# Patient Record
Sex: MALE | Race: WHITE | Employment: OTHER | URBAN - METROPOLITAN AREA
[De-identification: names, ages, dates, MRNs, and addresses within clinical notes are randomized per-mention and may not be internally consistent; named-entity substitution may affect disease eponyms.]

---

## 2022-07-15 ENCOUNTER — HOSPITAL ENCOUNTER (EMERGENCY)
Age: 72
Discharge: HOME | End: 2022-07-15
Payer: MEDICARE

## 2022-07-15 VITALS
TEMPERATURE: 97.7 F | RESPIRATION RATE: 16 BRPM | WEIGHT: 190 LBS | HEART RATE: 101 BPM | DIASTOLIC BLOOD PRESSURE: 84 MMHG | OXYGEN SATURATION: 96 % | SYSTOLIC BLOOD PRESSURE: 124 MMHG

## 2022-07-15 DIAGNOSIS — J01.90 ACUTE SINUSITIS, RECURRENCE NOT SPECIFIED, UNSPECIFIED LOCATION: Primary | ICD-10-CM

## 2022-07-15 PROCEDURE — 99211 OFF/OP EST MAY X REQ PHY/QHP: CPT

## 2022-07-15 RX ORDER — GEMFIBROZIL 600 MG/1
600 TABLET, FILM COATED ORAL
COMMUNITY

## 2022-07-15 RX ORDER — AMOXICILLIN AND CLAVULANATE POTASSIUM 875; 125 MG/1; MG/1
1 TABLET, FILM COATED ORAL 2 TIMES DAILY
Qty: 14 TABLET | Refills: 0 | Status: SHIPPED | OUTPATIENT
Start: 2022-07-15 | End: 2022-07-22

## 2022-07-15 RX ORDER — SIMVASTATIN 20 MG
20 TABLET ORAL NIGHTLY
COMMUNITY

## 2022-07-15 ASSESSMENT — PAIN SCALES - GENERAL: PAINLEVEL_OUTOF10: 0

## 2022-07-15 ASSESSMENT — PAIN - FUNCTIONAL ASSESSMENT: PAIN_FUNCTIONAL_ASSESSMENT: 0-10

## 2022-07-15 NOTE — ED PROVIDER NOTES
Department of Emergency Medicine   [address] [place] [address]  Provider Note  Admit Date/RoomTime: 7/15/2022  5:05 PM  Room:   NAME: Betty Parks  : 1950  MRN: [id number]     Chief Complaint:  Sinusitis (Congestion, pressure, started yesterday)    History of Present Illness       Betty Parks is a 67 y.o. old male who presents to the urgent care center by private vehicle for sinus infection. He said it started 2 days ago he said he gets sinus infections a lot he started having the pressure over his cheeks and the tenderness over his cheeks so he came in for evaluation. He is denying any fevers, sore throat, ear pain, cough, shortness of breath or any other complaints. ROS    Pertinent positives and negatives are stated within HPI, all other systems reviewed and are negative. Past Surgical History:   Procedure Laterality Date    KNEE ARTHROSCOPY     Social History:    Family History: family history is not on file. Allergies: Patient has no known allergies. Physical Exam            ED Triage Vitals   BP Temp Temp src Heart Rate Resp SpO2 Height Weight   07/15/22 1708 07/15/22 1708 -- 07/15/22 1708 07/15/22 1708 07/15/22 1708 -- 07/15/22 1709   124/84 97.7 °F (36.5 °C)  (!) 101 16 96 %  190 lb (86.2 kg)      Oxygen Saturation Interpretation: Normal.    Constitutional:  Alert, development consistent with age. Ears:  External Ears: Bilateral normal.               TM's & External Canals: normal appearance, normal TMs bilaterally. Nose:   There is no discharge, swelling or lesions noted. Sinuses: mild bilateral maxillary sinus tenderness. no bilateral frontal sinus tenderness. .  Neck/Lymphatics:  Neck Supple. Respiratory:   Breath sounds: bilateral normal.  Lung sounds: normal.   CV:  Regular rate and rhythm, normal heart sounds, without pathological murmurs, ectopy, gallops, or rubs. Integument:   Warm, dry, without visible rash.   Neurological:

## 2022-11-03 ENCOUNTER — HOSPITAL ENCOUNTER (EMERGENCY)
Age: 72
Discharge: HOME | End: 2022-11-03
Payer: MEDICARE

## 2022-11-03 VITALS
BODY MASS INDEX: 27.2 KG/M2 | RESPIRATION RATE: 20 BRPM | OXYGEN SATURATION: 97 % | WEIGHT: 190 LBS | DIASTOLIC BLOOD PRESSURE: 78 MMHG | SYSTOLIC BLOOD PRESSURE: 141 MMHG | HEART RATE: 106 BPM | HEIGHT: 70 IN | TEMPERATURE: 98.2 F

## 2022-11-03 DIAGNOSIS — H61.21 IMPACTED CERUMEN OF RIGHT EAR: Primary | ICD-10-CM

## 2022-11-03 PROCEDURE — 69209 REMOVE IMPACTED EAR WAX UNI: CPT

## 2022-11-03 PROCEDURE — 99211 OFF/OP EST MAY X REQ PHY/QHP: CPT

## 2022-11-03 ASSESSMENT — PAIN - FUNCTIONAL ASSESSMENT: PAIN_FUNCTIONAL_ASSESSMENT: 0-10

## 2022-11-03 ASSESSMENT — PAIN SCALES - GENERAL: PAINLEVEL_OUTOF10: 3

## 2022-11-03 ASSESSMENT — PAIN DESCRIPTION - DESCRIPTORS: DESCRIPTORS: PRESSURE

## 2022-11-03 ASSESSMENT — PAIN DESCRIPTION - LOCATION: LOCATION: EAR

## 2022-11-03 ASSESSMENT — PAIN DESCRIPTION - ORIENTATION: ORIENTATION: RIGHT

## 2022-11-03 NOTE — ED PROVIDER NOTES
[address] [facility]  Department of Emergency Medicine  UC Encounter Note  11/3/22   9:41 AM EDT      NAME: Marisela Jackson  :  1950  MRN:  [id number]    Chief Complaint: Ear Problem (Right ear feels clogged)      This is a 69-year-old male the presents to urgent care complaining of some muffled hearing in the right ear some mild pressure to the right ear for the past couple days. Has been using some peroxide in the right ear to clean out a possible wax impaction. Denies any left ear problems. He denies any hearing problems to begin with. He does not wear hearing aids. No fevers or chills no sinus pain pressure or URI symptoms. No chest pain or shortness of breath. On first contact patient he appears to be in no acute distress. Review of Systems  Pertinent positives and negatives are stated within HPI, all other systems reviewed and are negative. Physical Exam  Vitals and nursing note reviewed. Constitutional:       Appearance: He is well-developed. HENT:      Head: Normocephalic and atraumatic. Jaw: There is normal jaw occlusion. No trismus. Right Ear: Hearing and external ear normal. There is impacted cerumen. No mastoid tenderness. Left Ear: Hearing, tympanic membrane, ear canal and external ear normal. No mastoid tenderness. No hemotympanum. Nose: Nose normal. No congestion or rhinorrhea. Right Sinus: No maxillary sinus tenderness or frontal sinus tenderness. Left Sinus: No maxillary sinus tenderness or frontal sinus tenderness. Mouth/Throat:      Mouth: Mucous membranes are moist. No angioedema. Pharynx: Oropharynx is clear. Uvula midline. No uvula swelling. Eyes:      General: Lids are normal.      Conjunctiva/sclera: Conjunctivae normal.      Pupils: Pupils are equal, round, and reactive to light. Cardiovascular:      Rate and Rhythm: Normal rate and regular rhythm. Heart sounds: Normal heart sounds.  No murmur heard.  Pulmonary:      Effort: Pulmonary effort is normal.      Breath sounds: Normal breath sounds. Abdominal:      General: Bowel sounds are normal.      Palpations: Abdomen is soft. Abdomen is not rigid. Tenderness: There is no abdominal tenderness. There is no guarding or rebound. Musculoskeletal:      Cervical back: Normal range of motion and neck supple. Skin:     General: Skin is warm and dry. Findings: No abrasion or rash. Neurological:      General: No focal deficit present. Mental Status: He is alert and oriented to person, place, and time. GCS: GCS eye subscore is 4. GCS verbal subscore is 5. GCS motor subscore is 6. Cranial Nerves: No cranial nerve deficit. Sensory: No sensory deficit. Coordination: Coordination normal.      Gait: Gait normal.       Procedures    MDM  Number of Diagnoses or Management Options  Impacted cerumen of right ear  Diagnosis management comments: No acute distress. EAC was flushed by nurse. Ear was rechecked. TM normal.  No open area no bleeding. The EAC is slightly irritated but not bleeding or infected. He does state that his hearing is better. Instructions given.             --------------------------------------------- PAST HISTORY ---------------------------------------------  Past Medical History:  has a past medical history of Diabetes mellitus ([facility] [address] and Hyperlipidemia. Past Surgical History:  has a past surgical history that includes Knee arthroscopy. Social History:      Family History: family history is not on file. The patients home medications have been reviewed. Allergies: Patient has no known allergies. -------------------------------------------------- RESULTS -------------------------------------------------  No results found for this visit on 11/03/22.   No orders to display       ------------------------- NURSING NOTES AND VITALS REVIEWED ---------------------------   The nursing notes within the ED encounter and vital signs as below have been reviewed. BP (!) 141/78   Pulse (!) 106   Temp 98.2 °F (36.8 °C)   Resp 20   Ht 5' 10\" (1.778 m)   Wt 190 lb (86.2 kg)   SpO2 97%   BMI 27.26 kg/m²   Oxygen Saturation Interpretation: Normal      ------------------------------------------ PROGRESS NOTES ------------------------------------------   I have spoken with the patient and discussed todays results, in addition to providing specific details for the plan of care and counseling regarding the diagnosis and prognosis. Their questions are answered at this time and they are agreeable with the plan.      --------------------------------- ADDITIONAL PROVIDER NOTES ---------------------------------     This patient is stable for discharge. I have shared the specific conditions for return, as well as the importance of follow-up. * NOTE: This report was transcribed using voice recognition software. Every effort was made to ensure accuracy; however, inadvertent computerized transcription errors may be present.    --------------------------------- IMPRESSION AND DISPOSITION ---------------------------------    IMPRESSION  1.  Impacted cerumen of right ear        DISPOSITION  Disposition: Discharge to home  Patient condition is good       Martinez Feliciano PA-C  11/03/22 [id number]

## 2022-11-03 NOTE — ED NOTES
Irrigated right ear with good results. Pt states that he can hear well out of ear now.      Kena Falcon, JACINTO  42/32/80 [id number]

## 2022-12-26 ENCOUNTER — HOSPITAL ENCOUNTER (EMERGENCY)
Age: 72
Discharge: HOME | End: 2022-12-26
Payer: MEDICARE

## 2022-12-26 VITALS
TEMPERATURE: 98.4 F | DIASTOLIC BLOOD PRESSURE: 80 MMHG | SYSTOLIC BLOOD PRESSURE: 124 MMHG | RESPIRATION RATE: 16 BRPM | HEART RATE: 92 BPM | OXYGEN SATURATION: 99 %

## 2022-12-26 DIAGNOSIS — J01.90 ACUTE SINUSITIS, RECURRENCE NOT SPECIFIED, UNSPECIFIED LOCATION: Primary | ICD-10-CM

## 2022-12-26 PROCEDURE — 99211 OFF/OP EST MAY X REQ PHY/QHP: CPT

## 2022-12-26 RX ORDER — AMOXICILLIN AND CLAVULANATE POTASSIUM 875; 125 MG/1; MG/1
1 TABLET, FILM COATED ORAL 2 TIMES DAILY
Qty: 14 TABLET | Refills: 0 | Status: SHIPPED | OUTPATIENT
Start: 2022-12-26 | End: 2023-01-02

## 2022-12-26 NOTE — ED PROVIDER NOTES
Department of Emergency Medicine   [address] [place] [address]  Provider Note  Admit Date/RoomTime: 2022 10:15 AM  Room:   NAME: Ori Wiggins  : 1950  MRN: [id number]     Chief Complaint:  Sinus Problem (States he has a sinus infection, pressure, congestion, PND, started over the weekend)    History of Present Illness       Ori Wiggins is a 67 y.o. old male who presents to the urgent care center by private vehicle for evaluation. He has had sinus symptoms for a few days now. He said he is prone to getting sinus infections he has pressure over his forehead and behind his eyes. And congestion and drainage he denies fever, body aches, chills, chest pain or shortness of breath. He said he took a COVID test this morning and it was negative. ROS    Pertinent positives and negatives are stated within HPI, all other systems reviewed and are negative. Past Surgical History:   Procedure Laterality Date    KNEE ARTHROSCOPY     Social History:  reports that he has never smoked. He does not have any smokeless tobacco history on file. Family History: family history is not on file. Allergies: Patient has no known allergies. Physical Exam            ED Triage Vitals [22 1016]   BP Temp Temp src Heart Rate Resp SpO2 Height Weight   124/80 98.4 °F (36.9 °C) -- 92 16 99 % -- --      Oxygen Saturation Interpretation: Normal.    Constitutional:  Alert, appears well, no distress. Ears:  External Ears: Bilateral normal.               Nose:   There is no discharge, swelling or lesions noted. Sinuses: no bilateral maxillary sinus tenderness. mild bilateral frontal sinus tenderness. Mouth:  normal tongue and buccal mucosa. Throat: no erythema or exudates noted. Teeth and gums normal..  Airway patent. Neck/Lymphatics:  Neck Supple.    Respiratory:   Breath sounds: bilateral normal.  Lung sounds: normal.   CV:  Regular rate and rhythm, normal heart sounds, without pathological murmurs, ectopy, gallops, or rubs. GI:  Abdomen Soft, nontender, good bowel sounds. No firm or pulsatile mass. Integument:  Normal turgor. Warm, dry, without visible rash. Neurological:  Oriented. Motor functions intact. Lab / Imaging Results   (All laboratory and radiology results have been personally reviewed by myself)  Labs:  No results found for this visit on 12/26/22. Imaging: All Radiology results interpreted by Radiologist unless otherwise noted. No orders to display     ED Course / Medical Decision Making   Medications - No data to display       MDM:   Patient has had sinusitis for a few days he is prone to getting it he has pressure over his forehead he has postnasal drainage and congestion. No other symptoms he tested for COVID today and it was negative. I considered influenza but he has no fever body aches cough or any other symptoms I did treat him for sinusitis with Augmentin and advised him to follow-up with his doctor    1. Acute sinusitis, recurrence not specified, unspecified location      Plan   Discharge to home and advised to contact [facility] [phone]      As needed   Patient condition is good    New Medications     New Prescriptions    AMOXICILLIN-CLAVULANATE (AUGMENTIN) 875-125 MG PER TABLET    Take 1 tablet by mouth 2 times daily for 7 days     Electronically signed by ANGIE Malone CNP   DD: 12/26/22  **This report was transcribed using voice recognition software. Every effort was made to ensure accuracy; however, inadvertent computerized transcription errors may be present.   END OF ED PROVIDER NOTE      ANGIE Malone CNP  12/26/22 1125

## 2024-08-02 ENCOUNTER — HOSPITAL ENCOUNTER (EMERGENCY)
Age: 74
Discharge: HOME | End: 2024-08-02
Payer: MEDICARE

## 2024-08-02 VITALS
OXYGEN SATURATION: 96 % | HEIGHT: 70 IN | RESPIRATION RATE: 18 BRPM | WEIGHT: 185 LBS | SYSTOLIC BLOOD PRESSURE: 128 MMHG | BODY MASS INDEX: 26.48 KG/M2 | DIASTOLIC BLOOD PRESSURE: 99 MMHG | TEMPERATURE: 98.6 F | HEART RATE: 99 BPM

## 2024-08-02 DIAGNOSIS — J02.9 VIRAL PHARYNGITIS: Primary | ICD-10-CM

## 2024-08-02 LAB
SPECIMEN SOURCE: NORMAL
STREP A, MOLECULAR: NEGATIVE

## 2024-08-02 PROCEDURE — 87651 STREP A DNA AMP PROBE: CPT

## 2024-08-02 PROCEDURE — 99211 OFF/OP EST MAY X REQ PHY/QHP: CPT

## 2024-08-02 ASSESSMENT — PAIN - FUNCTIONAL ASSESSMENT: PAIN_FUNCTIONAL_ASSESSMENT: 0-10

## 2024-08-02 ASSESSMENT — PAIN SCALES - GENERAL: PAINLEVEL_OUTOF10: 0

## 2024-08-02 NOTE — ED PROVIDER NOTES
[facility]  Department of Emergency Medicine     Encounter Note  Admit Date/RoomTime: 2024 12:45 PM   Room:       NAME: Rafa Hines  : 1950  MRN: [id number]     Chief Complaint:  Pharyngitis (Started yesterday)    History of Present Illness      Rafa Hines is a 74 y.o. old male who presents to the urgent care with a 1 day history of a sore throat.  Patient states that his throat became somewhat sore yesterday.  No difficulty breathing, difficulty swallowing, drooling, wheezing, fevers, cough, nasal congestion, any other constitutional symptoms.  States his throat just feels a little bit scratchy.  Thinks he has strep.  No known sick contacts.              Exposed To:  Streptococcus: no.                              Infectious Mononucleosis:  unknown.        Symptoms:  Pain:  mild.                            Muffled Voice:  No.                            Hoarse:  No.                            Difficulty with Secretions:  No.    Centor Score (MODIFIED) For Strep Pharyngitis:    Age 3-14 Years   no (0)     Age >45 Years   YES  -1     Exudate or Swelling on Tonsils   no (0)     Tender/Swollen Anterior Cervical Nodes   no (0)     Fever? (T > 38°C, 100.4°F)   no (0)     Absence of Cough   yes (1)   TOTAL POINTS   0   Score of Zero = Probability of Strep Pharyngitis: 1% - 2.5%.,   No further testing nor antibiotics.  Score of 1 = Probability of Strep Pharyngitis: 5% - 10%.,   No further testing nor antibiotics.  Score of 2 = Probability of Strep Phar: 11% - 17%.   Culture/test all, ATB's only for positive culture results.  Score of 3 = Probability of Strep Phar: 28% - 35%.   Culture/test all, ATB's only for positive culture results  Score of 4 or 5 = Probability of Strep Pharyngitis: 51% - 53%.     Treat empirically with antibiotics.    ROS   Pertinent positives and negatives are stated within HPI, all other systems reviewed and are negative.    Past Medical History:  has a past

## 2024-08-02 NOTE — DISCHARGE INSTRUCTIONS
Your strep testing was negative.  Your symptoms are likely viral.  You may use salt water gargles and over-the-counter Chloraseptic throat spray as needed for sore throat.  Please follow-up with your regular doctor if symptoms do not improve.

## 2025-01-04 ENCOUNTER — HOSPITAL ENCOUNTER (EMERGENCY)
Age: 75
Discharge: HOME | End: 2025-01-04
Payer: MEDICARE

## 2025-01-04 VITALS
WEIGHT: 192 LBS | BODY MASS INDEX: 27.55 KG/M2 | OXYGEN SATURATION: 96 % | SYSTOLIC BLOOD PRESSURE: 129 MMHG | RESPIRATION RATE: 18 BRPM | DIASTOLIC BLOOD PRESSURE: 76 MMHG | TEMPERATURE: 99.1 F | HEART RATE: 100 BPM

## 2025-01-04 DIAGNOSIS — J06.9 VIRAL UPPER RESPIRATORY ILLNESS: Primary | ICD-10-CM

## 2025-01-04 LAB
FLUAV RNA RESP QL NAA+PROBE: NOT DETECTED
FLUBV RNA RESP QL NAA+PROBE: NOT DETECTED
SARS-COV-2 RNA RESP QL NAA+PROBE: NOT DETECTED
SOURCE: NORMAL
SPECIMEN DESCRIPTION: NORMAL

## 2025-01-04 PROCEDURE — 87636 SARSCOV2 & INF A&B AMP PRB: CPT

## 2025-01-04 PROCEDURE — 99211 OFF/OP EST MAY X REQ PHY/QHP: CPT

## 2025-01-04 RX ORDER — FLUTICASONE PROPIONATE 50 MCG
2 SPRAY, SUSPENSION (ML) NASAL DAILY
Qty: 16 G | Refills: 0 | Status: SHIPPED | OUTPATIENT
Start: 2025-01-04

## 2025-01-04 RX ORDER — IBUPROFEN 600 MG/1
600 TABLET, FILM COATED ORAL EVERY 8 HOURS PRN
Qty: 30 TABLET | Refills: 0 | Status: SHIPPED | OUTPATIENT
Start: 2025-01-04

## 2025-01-04 RX ORDER — DEXTROMETHORPHAN HBR, GUAIFENESIN 60; 1200 MG/1; MG/1
1 TABLET, EXTENDED RELEASE ORAL EVERY 12 HOURS PRN
Qty: 14 TABLET | Refills: 0 | Status: SHIPPED | OUTPATIENT
Start: 2025-01-04 | End: 2025-01-11

## 2025-01-04 RX ORDER — ACETAMINOPHEN 500 MG
1000 TABLET ORAL EVERY 8 HOURS PRN
Qty: 30 TABLET | Refills: 0 | Status: SHIPPED | OUTPATIENT
Start: 2025-01-04

## 2025-01-04 ASSESSMENT — PAIN - FUNCTIONAL ASSESSMENT: PAIN_FUNCTIONAL_ASSESSMENT: 0-10

## 2025-01-04 ASSESSMENT — PAIN SCALES - GENERAL: PAINLEVEL_OUTOF10: 0

## 2025-01-04 NOTE — ED PROVIDER NOTES
causative nature of illness is likely to be Viral in etiology. Therefore, symptomatic control with supportive meds is considered appropriate at this time. He is not hypoxic.  Patient is well appearing, non toxic, meets criteria for and is appropriate for outpatient management.    Normal progression of disease discussed.  Explained the rationale for symptomatic treatment rather than use of an antibiotic.  Instruction provided in the use of fluids, vaporizer, acetaminophen, and other OTC medication for symptom control.  Extra fluids  Analgesics as needed, dosages and times reviewed.  Follow up as needed should symptoms fail to improve in 5 to 7 days.  Advised to present to the ED for any new or worsening symptoms all questions answered patient agreeable with plan of care.    Assessment     1. Viral upper respiratory illness      Plan   Discharged home.  Patient condition is good    New Medications     Discharge Medication List as of 1/4/2025  9:12 AM        START taking these medications    Details   Dextromethorphan-guaiFENesin  MG TB12 Take 1 tablet by mouth every 12 hours as needed (mucus, congestion, post nasal drip), Disp-14 tablet, R-0Normal      fluticasone (FLONASE) 50 MCG/ACT nasal spray 2 sprays by Each Nostril route daily, Disp-16 g, R-0Normal      acetaminophen (TYLENOL) 500 MG tablet Take 2 tablets by mouth every 8 hours as needed for Pain or Fever Maximum dose- 8 tablets/24 hours., Disp-30 tablet, R-0Normal      ibuprofen (IBU) 600 MG tablet Take 1 tablet by mouth every 8 hours as needed for Pain or Fever Take with food., Disp-30 tablet, R-0Normal           Electronically signed by ANGIE Cadena CNP   DD: 1/4/25  **This report was transcribed using voice recognition software. Every effort was made to ensure accuracy; however, inadvertent computerized transcription errors may be present.  END OF ED PROVIDER NOTE         Madiha Samuels APRN - CNP  01/04/25 0909